# Patient Record
Sex: FEMALE | Race: WHITE | Employment: UNEMPLOYED | URBAN - METROPOLITAN AREA
[De-identification: names, ages, dates, MRNs, and addresses within clinical notes are randomized per-mention and may not be internally consistent; named-entity substitution may affect disease eponyms.]

---

## 2017-04-24 ENCOUNTER — HOSPITAL ENCOUNTER (EMERGENCY)
Age: 32
Discharge: HOME | End: 2017-04-24
Attending: EMERGENCY MEDICINE
Payer: OTHER GOVERNMENT

## 2017-04-24 VITALS
DIASTOLIC BLOOD PRESSURE: 81 MMHG | HEART RATE: 86 BPM | OXYGEN SATURATION: 100 % | RESPIRATION RATE: 19 BRPM | SYSTOLIC BLOOD PRESSURE: 126 MMHG | TEMPERATURE: 98.4 F

## 2017-04-24 DIAGNOSIS — F41.1 ANXIETY REACTION: Primary | ICD-10-CM

## 2017-04-24 PROCEDURE — 74011000250 HC RX REV CODE- 250: Performed by: EMERGENCY MEDICINE

## 2017-04-24 PROCEDURE — 94640 AIRWAY INHALATION TREATMENT: CPT

## 2017-04-24 PROCEDURE — 99284 EMERGENCY DEPT VISIT MOD MDM: CPT

## 2017-04-24 PROCEDURE — 77030013140 HC MSK NEB VYRM -A

## 2017-04-24 RX ORDER — IPRATROPIUM BROMIDE AND ALBUTEROL SULFATE 2.5; .5 MG/3ML; MG/3ML
3 SOLUTION RESPIRATORY (INHALATION)
Status: COMPLETED | OUTPATIENT
Start: 2017-04-24 | End: 2017-04-24

## 2017-04-24 RX ORDER — IPRATROPIUM BROMIDE AND ALBUTEROL SULFATE 2.5; .5 MG/3ML; MG/3ML
SOLUTION RESPIRATORY (INHALATION)
Status: DISCONTINUED
Start: 2017-04-24 | End: 2017-04-25 | Stop reason: HOSPADM

## 2017-04-24 RX ADMIN — IPRATROPIUM BROMIDE AND ALBUTEROL SULFATE 3 ML: .5; 3 SOLUTION RESPIRATORY (INHALATION) at 21:16

## 2017-04-25 NOTE — ED PROVIDER NOTES
HPI Comments: 9:05 PM Merilyn Bosworth is a 29 y.o. female with noted PMHx who presents to the ED via EMS c/o anxiety that began while she was sleeping. The patient explains she was sleeping when she had a nightmare and woke up anxious. Pt felt fine when she went to bed. Pt states her  called EMS when she woke up. Pt also c/o HA and CP. Pt states she cannot stop shaking. Pt is not complaining of any other symptoms currently. The  states that he heard banging on the wall and found her in a state where she could not breath. He adds the pt has a vagal nerve stimulator which shocks her to stop seizures. Pt has a hx of epilepsy. Pt had recently taken her 8 oclock dose of her medications. Pt has no other surgical history. Pt has a tooth abscess for which she has a dental appointment tomorrow. Pt had eaten fries earlier today. Pt does not normally have panic attacks. (There are no other concerns at this time. The history is provided by the patient, the spouse and the EMS personnel. Past Medical History:   Diagnosis Date    Panic attack     Seizures ([address]        History reviewed. No pertinent surgical history. History reviewed. No pertinent family history. Social History     Social History    Marital status:      Spouse name: N/A    Number of children: N/A    Years of education: N/A     Occupational History    Not on file. Social History Main Topics    Smoking status: Never Smoker    Smokeless tobacco: Not on file    Alcohol use No    Drug use: No    Sexual activity: Not on file     Other Topics Concern    Not on file     Social History Narrative    No narrative on file         ALLERGIES: Sulfa (sulfonamide antibiotics)    Review of Systems   Constitutional: Negative for fever. HENT: Negative for trouble swallowing. Respiratory: Negative for shortness of breath. Cardiovascular: Positive for chest pain.    Gastrointestinal: Negative for abdominal pain, diarrhea and vomiting. Genitourinary: Negative for difficulty urinating. Musculoskeletal: Negative for back pain and neck pain. Skin: Negative for wound. Neurological: Positive for tremors and headaches. Negative for syncope. Psychiatric/Behavioral: Negative for behavioral problems. All other systems reviewed and are negative. Vitals:    04/24/17 2105 04/24/17 2106 04/24/17 2130   BP:   (!) 140/93   Pulse:  (!) 134 (!) 109   Resp:  27 17   Temp: 98.4 °F (36.9 °C)     SpO2:  (!) 88% 100%            Physical Exam   Constitutional: She is oriented to person, place, and time. She appears well-developed. No distress. Tremulous, anxious-appearing, nad   HENT:   Head: Normocephalic and atraumatic. No visible, drainable abscess, no drooling, no trismus   Eyes: EOM are normal.   Neck: Normal range of motion. Cardiovascular: Intact distal pulses and normal pulses. Tachycardia present. Pulmonary/Chest: Effort normal and breath sounds normal. No respiratory distress. Vagal nerve stimulator to left upper chest   Abdominal: Soft. There is no tenderness. Musculoskeletal: Normal range of motion. Mechanically stable   Neurological: She is alert and oriented to person, place, and time. No cranial nerve deficit. Coordination normal.   No focal deficits noted   Psychiatric:   Anxious, tremulous   Nursing note and vitals reviewed. MDM  Number of Diagnoses or Management Options  Anxiety reaction:   Diagnosis management comments: 28 yo CF with PMHx anxiety and seizures presents by EMS with shortness of breath and increased anxiety. Pt with tremors to right arm, hyperventilating. Exam also significant for tachycardia. Examination most consistent with anxiety reaction, will give supportive care and monitor closely. 9:58 PM  Pt feeling better, tremors resolved, vitals improved. Dc home, symptom management, follow-up, return precautions.     Patient Progress  Patient progress: improved    ED Course Procedures    Vitals:  Patient Vitals for the past 12 hrs:   Temp Pulse Resp BP SpO2   04/24/17 2130 - (!) 109 17 (!) 140/93 100 %   04/24/17 2106 - (!) 134 27 - (!) 88 %   04/24/17 2105 98.4 °F (36.9 °C) - - - -       Medications ordered:   Medications   albuterol-ipratropium (DUO-NEB) 2.5 mg-0.5 mg/3 ml nebulizer solution (  Canceled Entry 4/24/17 2110)   albuterol-ipratropium (DUO-NEB) 2.5 MG-0.5 MG/3 ML (3 mL Nebulization Given 4/24/17 2116)         Disposition:  Diagnosis:   1. Anxiety reaction        Disposition: Discharged    Follow-up Information     Follow up With Details Comments Sivakumar Harris MD Call in 1 day For follow up [address] [facility] [address] [facility] EMERGENCY DEPT Go to As needed, If symptoms worsen [address]  [phone]           Patient's Medications    No medications on file       [address] [facility][note 3]  Documented by: Salvador Mak for, and in the presence of, Edie Smith. Vernon Antoine MD 9:06 PM     Signed by: Winter Parsons. 04/24/17, 9:06 PM.      PROVIDER ATTESTATION STATEMENT  I personally performed the services described in the documentation, reviewed the documentation, as recorded by the scribe in my presence, and it accurately and completely records my words and actions. Edie Smith.  Vernon Antoine MD

## 2017-04-25 NOTE — DISCHARGE INSTRUCTIONS

## 2017-04-25 NOTE — ED NOTES
I have reviewed discharge instructions with the patient and spouse. The patient and spouse verbalized understanding. Patient armband removed and shredded. Patient discharged via wheelchair to lobby with spouse.